# Patient Record
Sex: MALE | Race: WHITE | NOT HISPANIC OR LATINO | URBAN - METROPOLITAN AREA
[De-identification: names, ages, dates, MRNs, and addresses within clinical notes are randomized per-mention and may not be internally consistent; named-entity substitution may affect disease eponyms.]

---

## 2017-08-25 ENCOUNTER — OFFICE VISIT - RIVER FALLS (OUTPATIENT)
Dept: FAMILY MEDICINE | Facility: CLINIC | Age: 22
End: 2017-08-25

## 2017-08-25 ASSESSMENT — MIFFLIN-ST. JEOR: SCORE: 2557.29

## 2017-08-28 ENCOUNTER — AMBULATORY - RIVER FALLS (OUTPATIENT)
Dept: FAMILY MEDICINE | Facility: CLINIC | Age: 22
End: 2017-08-28

## 2017-11-30 ENCOUNTER — OFFICE VISIT - RIVER FALLS (OUTPATIENT)
Dept: FAMILY MEDICINE | Facility: CLINIC | Age: 22
End: 2017-11-30

## 2017-11-30 ASSESSMENT — MIFFLIN-ST. JEOR: SCORE: 2638.94

## 2022-02-11 VITALS
RESPIRATION RATE: 16 BRPM | HEIGHT: 72 IN | SYSTOLIC BLOOD PRESSURE: 148 MMHG | DIASTOLIC BLOOD PRESSURE: 80 MMHG | WEIGHT: 315 LBS | HEART RATE: 72 BPM | BODY MASS INDEX: 42.66 KG/M2 | TEMPERATURE: 99 F

## 2022-02-12 VITALS
HEART RATE: 88 BPM | HEIGHT: 72 IN | BODY MASS INDEX: 42.66 KG/M2 | WEIGHT: 315 LBS | RESPIRATION RATE: 20 BRPM | DIASTOLIC BLOOD PRESSURE: 78 MMHG | SYSTOLIC BLOOD PRESSURE: 134 MMHG | TEMPERATURE: 99.1 F | OXYGEN SATURATION: 96 %

## 2022-02-16 NOTE — NURSING NOTE
[facility] Ond[facility] Carp[facility] [phone].  Confirmation received Keshav Short CMA

## 2022-02-16 NOTE — PROGRESS NOTES
Patient:   VAISHALI CABRERA            MRN: [id number]            FIN: [id number]               Age:   22 years     Sex:  Male     :  1995   Associated Diagnoses:   Acute bacterial bronchitis   Author:   Vu Castellanos PA-C      Chief Complaint   2017 5:50 PM CST   Pt here for sinus pressure and productive cough that has been getting worse x month        History of Present Illness   Chief complaint and symptoms noted above and confirmed with patient   started with head cold/sinus for about a week and a half, then progressed to ongoing productive cough  has tried mucinex, robitussin, ibuprofen  cough is worse in the morning      Review of Systems   Constitutional:  Negative.    Ear/Nose/Mouth/Throat:  Nasal congestion, Sore throat, sinus pressure.    Respiratory:  Cough, Sputum production.       Health Status   Allergies:    Allergic Reactions (Selected)  No Known Medication Allergies   Medications: not on any regular medications   Problem list:    All Problems  Morbid obesity / SNOMED CT 791765084 / Probable      Histories   Past Medical History:    Resolved  History of chicken pox (309653730):  Resolved.   Family History:    HTN (Hypertension)  Father  Hypercholesterolemia  Father  MS (multiple sclerosis)....  Father     Procedure history:    Ts and As - Tonsillectomy and adenoidectomy (5219955565).  Procedure on bladder (255149168).  Arthroscopic meniscectomy (307545189).  Comments:  2017 4:06 PM - Keshav Short CMA  Left knee      Physical Examination   Vital Signs   2017 5:50 PM CST Temperature Tympanic 99.1 DegF    Peripheral Pulse Rate 88 bpm    Pulse Site Radial artery    Respiratory Rate 20 br/min    Systolic Blood Pressure 134 mmHg    Diastolic Blood Pressure 78 mmHg    Mean Arterial Pressure 97 mmHg    BP Site Right arm    Oxygen Saturation 96 %      Measurements from flowsheet : Measurements   2017 5:50 PM CST Height Measured - Standard 71.5 in    Weight Measured - Standard 358 lb     BSA 2.86 m2    Body Mass Index 49.23 kg/m2      General:  No acute distress.    HENT:  Tympanic membranes are clear, No pharyngeal erythema, No sinus tenderness, nares are patent.    Neck:  Supple, Non-tender, No lymphadenopathy.    Respiratory:  lungs have coarse ronchi bilaterally, no wheezes, no rales.       Impression and Plan   Diagnosis     Acute bacterial bronchitis (CYN05-TT J20.8).     Summary:  because sxs have been ongoing for a month, will treat with zpak and burst of prednisone, advised to use expectorants/decongestants, follow up if not improving.    Orders     Orders   Pharmacy:  predniSONE 20 mg oral tablet (Prescribe): 2 tab(s) ( 40 mg ), PO, Daily, x 5 day(s), Instructions: with food or milk, # 10 tab(s), 0 Refill(s), Type: Maintenance, Pharmacy: [facility], 2 tab(s) po daily,x5 day(s),Instr:with food or milk  Zithromax Z-Manuel 250 mg oral tablet (Prescribe): Take 2 tablets on Day 1 and then 1 tablet, PO, Daily, Instructions: until finished, # 6 tab(s), 0 Refill(s), Type: Maintenance, Pharmacy: [facility], Take 2 tablets on Day 1 and then 1 tablet po daily,Instr:until finished.     Orders   Charges (Evaluation and Management):  93072 office outpatient visit 15 minutes (Charge) (Order): Quantity: 1, Acute bacterial bronchitis.

## 2022-02-16 NOTE — PROGRESS NOTES
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   Family History:    HTN (Hypertension)  Father  Hypercholesterolemia  Father  MS (multiple sclerosis)....  Father     Procedure history:    Ts and As - Tonsillectomy and adenoidectomy (4348006505).  Procedure on bladder (908813042).  Arthroscopic meniscectomy (368845276).  Comments:  2017 4:06 PM - Keshav Short CMA  Left knee   Social History:        Alcohol Assessment            Current, 1-2 times per week      Tobacco Assessment            Never smoker      Substance Abuse Assessment            Never      Employment and Education Assessment            Student                     Comments:                      2017 - Keshav Short CMA                     Senior at [employer]        Objective       Vital Signs   2017 3:59 PM CDT Temperature Tympanic 99 DegF    Peripheral Pulse Rate 72 bpm    Pulse Site Radial artery    Respiratory Rate 16 br/min    Systolic Blood Pressure 154 mmHg  HI    Diastolic Blood Pressure 76 mmHg    Mean Arterial Pressure 102 mmHg    BP Site Right arm    BP Systolic Repeat 148 mmHg    BP Diastolic Repeat 80 mmHg    BP Site Repeat Right arm      Measurements from flowsheet : Measurements   2017 3:59 PM CDT Height Measured - Standard 71.5 in    Weight Measured -  Standard 340 lb    BSA 2.79 m2    Body Mass Index 46.75 kg/m2      General:  No acute distress.    Eye:  Pupils are equal, round and reactive to light, Extraocular movements are intact.    HENT:  Tympanic membranes are clear, No pharyngeal erythema, No sinus tenderness.    Neck:  Supple, Non-tender, No lymphadenopathy.    Respiratory:  Lungs are clear to auscultation.    Cardiovascular:  Normal rate, Regular rhythm, No murmur.    Gastrointestinal:  Soft, Non-tender, Non-distended, Normal bowel sounds, No organomegaly.         Abdomen: Obese.    Genitourinary:  testicles smooth symmetrical, without nodules, no rashes or lesions, no hernia present.    Musculoskeletal:  Normal range of motion.    Neurologic:  Cranial Nerves II-XII are grossly intact, Normal deep tendon reflexes.    Psychiatric:  Appropriate mood & affect.       Plan   Impression and Plan:     Diagnosis     Pre-employment examination (ZRD70-PA Z02.1).     .    Condition Form completed and signed.  Approved without restrictions   , and monitor blood pressure, recommended weight loss, better diet and exercise.    Orders     Orders   Charges (Evaluation and Management):  07027 initial preventive medicine new pt age 18-39yrs (Charge) (Order): Quantity: 1, Pre-employment examination.     .